# Patient Record
Sex: FEMALE | Race: BLACK OR AFRICAN AMERICAN | NOT HISPANIC OR LATINO | URBAN - METROPOLITAN AREA
[De-identification: names, ages, dates, MRNs, and addresses within clinical notes are randomized per-mention and may not be internally consistent; named-entity substitution may affect disease eponyms.]

---
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
## 2025-04-06 NOTE — ED PEDIATRIC NURSE NOTE - CARDIO WDL
History of a fall onto a rail a few days ago. Continued to have progressive pain  Presented to [facility] ED, found to have retroperitoneal hemorrhage with areas of active contrast extravasation. Transferred to [facility]  S/p IR embolization of branch of the deep circumflex iliac artery and superior gluteal artery overnight  Management per primary service   Normal rate, regular rhythm, normal S1, S2 heart sounds heard.